# Patient Record
Sex: MALE
[De-identification: names, ages, dates, MRNs, and addresses within clinical notes are randomized per-mention and may not be internally consistent; named-entity substitution may affect disease eponyms.]

---

## 2019-03-08 ENCOUNTER — RX ONLY (RX ONLY)
Age: 64
End: 2019-03-08

## 2019-03-08 ENCOUNTER — DOCTOR'S OFFICE (OUTPATIENT)
Dept: URBAN - METROPOLITAN AREA CLINIC 127 | Facility: CLINIC | Age: 64
Setting detail: OPHTHALMOLOGY
End: 2019-03-08
Payer: MEDICAID

## 2019-03-08 DIAGNOSIS — H25.13: ICD-10-CM

## 2019-03-08 DIAGNOSIS — H01.004: ICD-10-CM

## 2019-03-08 DIAGNOSIS — H04.123: ICD-10-CM

## 2019-03-08 DIAGNOSIS — H01.001: ICD-10-CM

## 2019-03-08 DIAGNOSIS — D23.112: ICD-10-CM

## 2019-03-08 PROBLEM — H52.13 MYOPIA OU; BOTH EYES: Status: ACTIVE | Noted: 2019-03-08

## 2019-03-08 PROBLEM — I63.9 CVA: Status: ACTIVE | Noted: 2019-03-08

## 2019-03-08 PROCEDURE — 92004 COMPRE OPH EXAM NEW PT 1/>: CPT | Performed by: OPHTHALMOLOGY

## 2019-03-08 PROCEDURE — 67840 REMOVE EYELID LESION: CPT | Performed by: OPHTHALMOLOGY

## 2019-03-08 ASSESSMENT — REFRACTION_CURRENTRX
OS_OVR_VA: 20/
OD_OVR_VA: 20/

## 2019-03-08 ASSESSMENT — REFRACTION_MANIFEST
OD_ADD: +2.50
OD_CYLINDER: +0.75
OU_VA: 20/
OD_AXIS: 175
OU_VA: 20/
OS_VA3: 20/
OS_AXIS: 180
OS_CYLINDER: +0.75
OD_VA1: 20/
OS_SPHERE: -0.25
OS_VA1: 20/
OD_VA2: 20/
OD_SPHERE: -0.25
OS_VA3: 20/
OD_VA1: 20/40
OD_VA3: 20/
OS_VA1: 20/30
OD_VA2: 20
OS_ADD: +2.50
OD_VA3: 20/
OS_VA2: 20/
OS_VA2: 20/

## 2019-03-08 ASSESSMENT — TEAR BREAK UP TIME (TBUT)
OD_TBUT: 1+
OS_TBUT: 1+

## 2019-03-08 ASSESSMENT — SPHEQUIV_DERIVED
OD_SPHEQUIV: 0.125
OS_SPHEQUIV: 0.125
OD_SPHEQUIV: 0.125
OS_SPHEQUIV: 0.125

## 2019-03-08 ASSESSMENT — SUPERFICIAL PUNCTATE KERATITIS (SPK)
OS_SPK: 1+
OD_SPK: 1+

## 2019-03-08 ASSESSMENT — LID EXAM ASSESSMENTS
OD_BLEPHARITIS: 2+
OS_BLEPHARITIS: 2+

## 2019-03-08 ASSESSMENT — REFRACTION_AUTOREFRACTION
OS_CYLINDER: +0.75
OD_SPHERE: -0.25
OD_AXIS: 176
OS_AXIS: 004
OD_CYLINDER: +0.75
OS_SPHERE: -0.25

## 2019-03-08 ASSESSMENT — VISUAL ACUITY
OD_BCVA: 20/40+1
OS_BCVA: 20/30

## 2019-03-08 ASSESSMENT — CONFRONTATIONAL VISUAL FIELD TEST (CVF)
OS_FINDINGS: FULL
OD_FINDINGS: FULL

## 2021-03-15 ENCOUNTER — TELEPHONE (OUTPATIENT)
Dept: GASTROENTEROLOGY | Facility: CLINIC | Age: 66
End: 2021-03-15

## 2021-03-15 NOTE — TELEPHONE ENCOUNTER
Took call from Dr Nya roper    she is requesting a 2-3 month recall colon   Was discharged from hospital 3/12/21